# Patient Record
Sex: FEMALE | Race: BLACK OR AFRICAN AMERICAN | NOT HISPANIC OR LATINO | Employment: UNEMPLOYED | URBAN - NONMETROPOLITAN AREA
[De-identification: names, ages, dates, MRNs, and addresses within clinical notes are randomized per-mention and may not be internally consistent; named-entity substitution may affect disease eponyms.]

---

## 2021-05-26 ENCOUNTER — TELEPHONE (OUTPATIENT)
Dept: CARDIAC SURGERY | Facility: CLINIC | Age: 36
End: 2021-05-26

## 2021-07-19 ENCOUNTER — OFFICE VISIT (OUTPATIENT)
Dept: CARDIAC SURGERY | Facility: CLINIC | Age: 36
End: 2021-07-19

## 2021-07-19 VITALS
HEIGHT: 66 IN | SYSTOLIC BLOOD PRESSURE: 130 MMHG | HEART RATE: 80 BPM | DIASTOLIC BLOOD PRESSURE: 82 MMHG | WEIGHT: 266.4 LBS | BODY MASS INDEX: 42.81 KG/M2 | OXYGEN SATURATION: 96 %

## 2021-07-19 DIAGNOSIS — I42.2 HYPERTROPHIC CARDIOMYOPATHY (HCC): Primary | ICD-10-CM

## 2021-07-19 DIAGNOSIS — Q24.9 CONGENITAL HEART DISEASE: ICD-10-CM

## 2021-07-19 PROBLEM — Q21.0 VENTRICULAR SEPTAL DEFECT: Status: ACTIVE | Noted: 2021-07-19

## 2021-07-19 PROBLEM — R01.1 HEART MURMUR: Status: ACTIVE | Noted: 2021-07-19

## 2021-07-19 PROBLEM — I51.7 CARDIAC HYPERTROPHY: Status: ACTIVE | Noted: 2021-07-19

## 2021-07-19 PROBLEM — Z87.74 S/P VSD REPAIR: Status: ACTIVE | Noted: 2021-07-19

## 2021-07-19 PROBLEM — E11.65 TYPE 2 DIABETES MELLITUS WITH HYPERGLYCEMIA (HCC): Status: ACTIVE | Noted: 2021-07-19

## 2021-07-19 PROBLEM — I10 ESSENTIAL HYPERTENSION: Status: ACTIVE | Noted: 2021-07-19

## 2021-07-19 PROBLEM — I49.01 VENTRICULAR FIBRILLATION (HCC): Status: ACTIVE | Noted: 2021-07-19

## 2021-07-19 PROCEDURE — 99205 OFFICE O/P NEW HI 60 MIN: CPT | Performed by: SURGERY

## 2021-07-28 NOTE — PROGRESS NOTES
Cardiac Surgery Consultation    Referring Physician: Kaitlin Behnken, APRN    Primary Care Physician: Unknown    Chief Complaint   Patient presents with   • Hypertrophic Cardiomyopathy     New pt from Behnken         Subjective     Ms. Brown is a 36-year-old female who presents with HOCM.  She is known about her cardiomyopathy since 2018.  She has a history of a VSD repair at age 3 at the [facility].  She was followed at [facility] for her hypertrophic cardiomyopathy since 2018.  She had a AICD placed in September 2018 and since then has had one defibrillation.  She presents now with being tired and short of breath.  Sometimes with activity she will get chest pain and have episodes of gasping for breath.  This is opposite also happen occasionally at rest.  She does not smoke, she does not drink or do drugs.  She has had no other cardiac interventions except the VSD repair and AICD placement.  She was referred here after her insurance stopped covering [facility] in [place].        Review of Systems   Constitutional: Positive for fatigue.   Respiratory: Positive for choking, chest tightness and shortness of breath.    Cardiovascular: Positive for chest pain. Negative for palpitations and leg swelling.        A complete 10 system review of systems was performed, is negative except stated above.    Past Medical History:   Diagnosis Date   • Diabetes mellitus (CMS/HCC)    • Heart murmur    • Hyperlipidemia    • Hypertension    • Hypertrophic cardiomyopathy (CMS/HCC)    • Pacemaker    • Ventricular septal defect      Past Surgical History:   Procedure Laterality Date   • CORONARY ARTERY BYPASS GRAFT  10/01/1988   • PACEMAKER IMPLANTATION  09/01/2018     Family History   Problem Relation Age of Onset   • Hypertension Mother    • Heart attack Father    • Hypertension Father    • Hypertension Sister    • Hypertension Brother      Social History     Tobacco Use   • Smoking  "status: Never Smoker   • Smokeless tobacco: Never Used   Substance Use Topics   • Alcohol use: Yes     Comment: occ   • Drug use: Never     Current Outpatient Medications   Medication Sig Dispense Refill   • carvedilol (COREG) 25 MG tablet Take 12.5 mg by mouth 2 (Two) Times a Day.     • Ibuprofen 200 MG capsule Take  by mouth.     • verapamil ER (VERELAN) 360 MG 24 hr capsule Take 360 mg by mouth Every Night.     • cetirizine (zyrTEC) 10 MG tablet Take 10 mg by mouth Daily.       No current facility-administered medications for this visit.     Allergies:  Labetalol, Pork-derived products, and Toprol xl [metoprolol]    Objective      Vital Signs  Visit Vitals  /82 (BP Location: Left arm, Patient Position: Sitting, Cuff Size: Adult)   Pulse 80   Ht 167.6 cm (66\")   Wt 121 kg (266 lb 6.4 oz)   SpO2 96%   BMI 43.00 kg/m²         Physical Exam  Vitals reviewed.   Constitutional:       General: She is not in acute distress.     Appearance: She is well-developed. She is obese. She is not diaphoretic.   HENT:      Head: Normocephalic and atraumatic.   Eyes:      General: No scleral icterus.     Pupils: Pupils are equal, round, and reactive to light.   Neck:      Vascular: No JVD.      Trachea: No tracheal deviation.   Cardiovascular:      Rate and Rhythm: Normal rate and regular rhythm.      Heart sounds: Murmur heard.        Comments: Early systolic murmur  Pulmonary:      Effort: Pulmonary effort is normal. No respiratory distress.      Breath sounds: Normal breath sounds. No stridor. No wheezing.   Abdominal:      General: There is no distension.      Palpations: Abdomen is soft.      Tenderness: There is no abdominal tenderness.   Musculoskeletal:         General: No deformity. Normal range of motion.      Cervical back: Normal range of motion and neck supple.   Skin:     General: Skin is warm and dry.      Capillary Refill: Capillary refill takes less than 2 seconds.      Coloration: Skin is not pale.      " Findings: No erythema or rash.   Neurological:      Mental Status: She is alert and oriented to person, place, and time.      Cranial Nerves: No cranial nerve deficit.   Psychiatric:         Behavior: Behavior normal.         Thought Content: Thought content normal.         Judgment: Judgment normal.         Results Review:     WBC   Date Value Ref Range Status   11/15/2019 5.2 4.0 - 10.5 10*3/uL Final     RBC   Date Value Ref Range Status   11/15/2019 4.43 4.20 - 5.40 10*6/uL Final     Hemoglobin   Date Value Ref Range Status   11/15/2019 11.4 (L) 12.5 - 16.0 g/dL Final     Hematocrit   Date Value Ref Range Status   11/15/2019 36.6 (L) 37.0 - 47.0 % Final     MCV   Date Value Ref Range Status   11/15/2019 82.6 78.0 - 100.0 fL Final     MCH   Date Value Ref Range Status   11/15/2019 25.7 (L) 27.0 - 37.0 pg Final     MCHC   Date Value Ref Range Status   11/15/2019 31.1 (L) 32.0 - 36.0 g/dL Final     RDW   Date Value Ref Range Status   11/15/2019 14.8 (H) 11.5 - 14.5 % Final     RDW-SD   Date Value Ref Range Status   11/15/2019 45.1 36.4 - 46.3 fL Final     MPV   Date Value Ref Range Status   11/15/2019 9.5 6.0 - 10.8 fL Final     Platelets   Date Value Ref Range Status   11/15/2019 328 150 - 450 10*3/uL Final     Neutrophil Rel %   Date Value Ref Range Status   11/15/2019 66.5 % Final     Lymphocyte Rel %   Date Value Ref Range Status   11/15/2019 22.8 % Final     Monocyte Rel %   Date Value Ref Range Status   11/15/2019 8.6 % Final     Eosinophil %   Date Value Ref Range Status   11/15/2019 1.3 % Final     Basophil Rel %   Date Value Ref Range Status   11/15/2019 0.4 % Final     Immature Grans %   Date Value Ref Range Status   11/15/2019 0.4 % Final     Neutrophils Absolute   Date Value Ref Range Status   11/15/2019 3.5 1.5 - 6.6 10*3/uL Final     Lymphocytes Absolute   Date Value Ref Range Status   11/15/2019 1.2 1.0 - 3.5 10*3/uL Final     Monocytes Absolute   Date Value Ref Range Status   11/15/2019 0.5 0.0 - 1.0  10*3/uL Final     Eosinophils Absolute   Date Value Ref Range Status   11/15/2019 0.1 0.0 - 0.7 10*3/uL Final     Basophils Absolute   Date Value Ref Range Status   11/15/2019 0.0 0.0 - 0.1 10*3/uL Final     Immature Grans, Absolute   Date Value Ref Range Status   11/15/2019 0.0 0.0 - 0.2 10*3/uL Final     nRBC   Date Value Ref Range Status   11/15/2019 0.00 % Final     Sodium   Date Value Ref Range Status   11/15/2019 137 134 - 145 mmol/L Final     Potassium   Date Value Ref Range Status   11/15/2019 4.0 3.4 - 5.0 mmol/L Final     Total CO2   Date Value Ref Range Status   11/15/2019 24 22 - 30 mmol/L Final     Chloride   Date Value Ref Range Status   11/15/2019 104 98 - 107 mmol/L Final     Anion Gap   Date Value Ref Range Status   11/15/2019 10 8 - 16 Final     Creatinine   Date Value Ref Range Status   11/15/2019 0.9 0.5 - 1.1 mg/dL Final     BUN   Date Value Ref Range Status   11/15/2019 13 7 - 17 mg/dL Final     Calcium   Date Value Ref Range Status   11/15/2019 9.2 8.4 - 10.2 mg/dL Final     Alkaline Phosphatase   Date Value Ref Range Status   11/15/2019 63 38 - 126 U/L Final     Total Protein   Date Value Ref Range Status   11/15/2019 7.7 6.3 - 8.2 g/dL Final     ALT (SGPT)   Date Value Ref Range Status   11/15/2019 29 9 - 52 U/L Final     AST (SGOT)   Date Value Ref Range Status   11/15/2019 25 14 - 36 U/L Final     Total Bilirubin   Date Value Ref Range Status   11/15/2019 0.4 0.2 - 1.3 mg/dL Final     Albumin   Date Value Ref Range Status   11/15/2019 4.4 3.5 - 5.0 g/dL Final        I reviewed the patient's clinical results and discussed with patient.    ECHO:        I personally reviewed images of following exams, the following is my interpretation:    ECHO: Very thickened concentric LV, this is not isolated to the septum but instead hypertrophic throughout LV with a very small LV cavity, her diastolic dysfunction is very severe, her EF looks to be about 55-60 and her left atrium is dilated, she has a  dynamic LV outflow tract obstruction and some FERNANDO occasionally on some images          Assessment/Plan     Ms. Brown is a 36-year-old female with hypertrophic obstructive cardiomyopathy complicated by arrhythmia, FERNANDO and is currently symptomatic.  She is previously followed and treated at the [facility] but her insurance has stopped covering this.  She was referred to me for consideration of septal myectomy.  I have reviewed her echocardiogram, her hypertrophic cardiomyopathy is very concentric and not isolated to the septum.  She has a history of an open VSD repair via sternotomy as a child.  I discussed with her, her case is very complicated and a septal myectomy in the setting of previous VSD repair should be performed at a tertiary care center.  She does have an inducible gradient greater than 50 across her LVOT per report.  Also discussed with her that her hypertrophy is concentric and not isolated the septum and this might lead to other treatments instead of septal myectomy.  Her symptoms are improved since going to 25 mg twice daily of carvedilol, she is also on verapamil.  I did discuss her case with her provider, Kaitlin Behnken, the medical therapy is completely appropriate and in line with guidelines according to the 2020 AHA/ACC hypertrophic cardiomyopathy treatment guidelines.     I have recommended referral to a tertiary care center with expertise in adults with history of congenital heart disease.  If insurance does not cover [facility] there is multiple regional centers including [facility] in [place], [facility] in [place].  There is a specialist at the [facility] that specializes in concentric hypertrophy such as ronnie, Dr. Ruelas.  The patient does not want to travel far but I believe it might be in her best interest to travel somewhere with expertise in concentric hypertrophic cardiomyopathy.  If there are trouble with referrals through her cardiology  office please recontact me and I can assist.    Thank you for trusting me with the care of Ms. Brown.  Please do not hesitate to call with any questions or concerns.    Domingo Leavitt M.D.  Cardiothoracic Surgeon